# Patient Record
Sex: MALE | Race: OTHER | URBAN - METROPOLITAN AREA
[De-identification: names, ages, dates, MRNs, and addresses within clinical notes are randomized per-mention and may not be internally consistent; named-entity substitution may affect disease eponyms.]

---

## 2017-06-22 ENCOUNTER — OFFICE VISIT (OUTPATIENT)
Dept: FAMILY MEDICINE CLINIC | Age: 27
End: 2017-06-22

## 2017-06-22 VITALS
HEART RATE: 57 BPM | HEIGHT: 64 IN | TEMPERATURE: 97.7 F | DIASTOLIC BLOOD PRESSURE: 72 MMHG | WEIGHT: 153.4 LBS | BODY MASS INDEX: 26.19 KG/M2 | OXYGEN SATURATION: 98 % | SYSTOLIC BLOOD PRESSURE: 124 MMHG

## 2017-06-22 DIAGNOSIS — L65.9 ALOPECIA OF SCALP: Primary | ICD-10-CM

## 2017-06-22 RX ORDER — TRIAMCINOLONE ACETONIDE 1 MG/ML
LOTION TOPICAL 3 TIMES DAILY
Qty: 60 ML | Refills: 1 | Status: SHIPPED | OUTPATIENT
Start: 2017-06-22

## 2017-06-22 NOTE — PROGRESS NOTES
Patient seen for discharge with the assistance of Quinn Khan, . We reviewed the AVS, prescription and pharmacy location. The patient understands to return to a [facility] clinic if his symptoms worsen or do not improve.  Roya Cid RN

## 2017-06-22 NOTE — PATIENT INSTRUCTIONS
Caída del terrence por alopecia areata: Instrucciones de cuidado - [ Hair Loss From Alopecia Areata: Care Instructions ]  Instrucciones de cuidado    La alopecia areata es un tipo de caída del terrence que afecta al pelo del cuero cabelludo o de otras zonas del cuerpo. Es un problema que puede desaparecer alecia un tiempo y que luego puede regresar. La afección es más común Constantino Schwab de [address], aunque puede aparecer en niños y adultos de todas las edades. La caída del terrence puede afectar la forma en que usted se siente consigo mismo. El terrence se le puede caer en mechones y volver a crecer con el tiempo. En casos raros, bhavana persona con alopecia podría perder todo el vello corporal. El patrón de caída y crecimiento del terrence es diferente para todos. Usted puede tratar la alopecia con medicamentos, aunque el tratamiento no siempre funciona. Podría recibir inyecciones de medicamentos en el cuero cabelludo o en la piel, ashleigh pastillas o aplicarse medicamentos en el cuero cabelludo o en la piel. O usted podría decidir esperar a nancy si el terrence vuelve a crecer antes de probar con medicamentos. Debido a que la caída del terrence es [place] para la mayoría de las personas, busque el [de-identified] de amigos y familiares. Hable con un consejero u otro profesional si necesita más ayuda. La atención de seguimiento es bhavana parte clave de olvera tratamiento y seguridad. Asegúrese de hacer y acudir a todas las citas, y llame a olvera médico si está teniendo problemas. También es bhavana buena idea saber los resultados de naomie exámenes y mantener bhavana lista de los medicamentos que jayla. ¿Cómo puede cuidarse en el hogar? · Si decide tratar la caída de terrence, use los medicamentos exactamente [employer]	Bonnyman indicaron. Llame a olvera médico si eriberto estar teniendo problemas con olvera medicamento. · Si desea cubrirse el cuero cabelludo, puede usar gorros o sombreros, pañuelos u otro tipo de [place] para la evelia.  O elizabeth vez desee usar un postizo o bhavana peluca. · Pruebe productos para el cuidado del terrence y peinados nuevos. Los productos para el cuidado del terrence y las permanentes podrían hacer que olvera terrence parezca más grueso. Puede usar tinturas para colorear el cuero cabelludo. Maty el uso prolongado de permanentes o tinturas puede causar bhavana mayor caída del terrence. · Hable con olvera médico si está muy disgustado acerca de la caída del terrence. Puede recibir asesoría psicológica para ayudarle a sobrellevar la afección. ¿Cuándo debe pedir ayuda? Preste especial atención a los cambios en olvera bev y asegúrese de comunicarse con olvera médico si:  · No mejora valerio se esperaba. ¿Dónde puede encontrar más información en inglés? [place] a http://zenon-jen.info/. Carlos Vu [id number] en la búsqueda para aprender más acerca de \"Caída del terrence por alopecia areata: Instrucciones de cuidado - [ Hair Loss From Alopecia Areata: Care Instructions ]. \"  Revisado: 13 octubre, 2016  Versión del contenido: 11.3  © 1025-6079 [employer]. Las instrucciones de cuidado fueron adaptadas bajo licencia por [employer] (which disclaims liability or warranty for this information). Si usted tiene Johnson Corinne afección médica o sobre estas instrucciones, siempre pregunte a olvera profesional de bev. [employer], Incorporated niega toda garantía o responsabilidad por olvera uso de esta información.

## 2017-06-22 NOTE — PROGRESS NOTES
Assessment/Plan:    Nuha Dumont was seen today for hair/scalp problem. Diagnoses and all orders for this visit:    Alopecia of scalp  -     triamcinolone (KENALOG) 0.1 % lotion; Apply  to affected area three (3) times daily. use thin layer        Follow-up Disposition:  Return if symptoms worsen or fail to improve. EMERSON Cam expressed understanding of this plan. An AVS was printed and given to the patient.      ----------------------------------------------------------------------    Chief Complaint   Patient presents with    Hair/Scalp Problem     pt c/o hair loss in patches x1 month       History of Present Illness:  31 yo generally well male here for a problem with 2 areas on his head that have complete hair loss. No rash or pruritis at the site of hair loss. He is a non smoker. He only occasionally drinks ETOH. He exercises daily with running and weight lifting, he eats well. No past medical history on file. Current Outpatient Prescriptions   Medication Sig Dispense Refill    triamcinolone (KENALOG) 0.1 % lotion Apply  to affected area three (3) times daily. use thin layer 60 mL 1       No Known Allergies    Social History   Substance Use Topics    Smoking status: Never Smoker    Smokeless tobacco: Never Used    Alcohol use Yes      Comment: x6 beers every 2 weeks       No family history on file.     Physical Exam:     Visit Vitals    /72 (BP 1 Location: Right arm)    Pulse (!) 57    Temp 97.7 °F (36.5 °C) (Oral)    Ht 5' 3.94\" (1.624 m)    Wt 153 lb 6.4 oz (69.6 kg)    SpO2 98%    BMI 26.38 kg/m2       A&Ox3  WDWN NAD  Respirations normal and non labored  2 annular lesions on his right side of scalp- the first is the smaller dime sized area of hair loss at his \"sideburn\" and the second is more posterior and is quarter sized